# Patient Record
Sex: FEMALE | Race: WHITE | NOT HISPANIC OR LATINO | Employment: FULL TIME | URBAN - METROPOLITAN AREA
[De-identification: names, ages, dates, MRNs, and addresses within clinical notes are randomized per-mention and may not be internally consistent; named-entity substitution may affect disease eponyms.]

---

## 2023-09-14 PROBLEM — L72.3 SEBACEOUS CYST: Status: ACTIVE | Noted: 2023-09-14

## 2023-09-14 PROBLEM — E55.9 VITAMIN D DEFICIENCY: Status: ACTIVE | Noted: 2023-09-14

## 2023-09-14 PROBLEM — I83.891 VARICOSE VEINS OF RIGHT LOWER EXTREMITY WITH EDEMA: Status: ACTIVE | Noted: 2023-09-14

## 2023-09-14 PROBLEM — R92.30 DENSE BREAST TISSUE: Status: ACTIVE | Noted: 2023-09-14

## 2023-09-14 PROBLEM — I87.321 CHRONIC VENOUS HYPERTENSION WITH INFLAMMATION INVOLVING RIGHT SIDE: Status: ACTIVE | Noted: 2023-09-14

## 2023-09-14 PROBLEM — D64.9 ANEMIA: Status: ACTIVE | Noted: 2023-09-14

## 2023-09-14 PROBLEM — I80.9 PHLEBITIS: Status: ACTIVE | Noted: 2023-09-14

## 2023-09-14 RX ORDER — ALBUTEROL SULFATE 90 UG/1
AEROSOL, METERED RESPIRATORY (INHALATION)
COMMUNITY
Start: 2021-12-02

## 2023-09-14 RX ORDER — LEVOCETIRIZINE DIHYDROCHLORIDE 5 MG/1
1 TABLET, FILM COATED ORAL EVERY EVENING
COMMUNITY
Start: 2022-08-03

## 2023-09-14 RX ORDER — CHLORHEXIDINE GLUCONATE ORAL RINSE 1.2 MG/ML
SOLUTION DENTAL
COMMUNITY
Start: 2022-09-07

## 2023-09-14 RX ORDER — CLOTRIMAZOLE AND BETAMETHASONE DIPROPIONATE 10; .64 MG/G; MG/G
CREAM TOPICAL
COMMUNITY
Start: 2022-08-03

## 2023-09-14 RX ORDER — FAMOTIDINE 40 MG/1
1 TABLET, FILM COATED ORAL NIGHTLY
COMMUNITY
Start: 2022-08-03

## 2023-09-14 RX ORDER — CHLORHEXIDINE GLUCONATE 40 MG/ML
SOLUTION TOPICAL
COMMUNITY
Start: 2022-09-07

## 2023-09-14 RX ORDER — CHOLECALCIFEROL (VITAMIN D3) 125 MCG
1 TABLET ORAL DAILY
COMMUNITY

## 2023-09-14 RX ORDER — CYCLOBENZAPRINE HCL 10 MG
TABLET ORAL
COMMUNITY
Start: 2022-05-27

## 2023-10-17 ENCOUNTER — OFFICE VISIT (OUTPATIENT)
Dept: VASCULAR SURGERY | Facility: CLINIC | Age: 43
End: 2023-10-17
Payer: COMMERCIAL

## 2023-10-17 VITALS
HEART RATE: 83 BPM | DIASTOLIC BLOOD PRESSURE: 83 MMHG | BODY MASS INDEX: 30.01 KG/M2 | SYSTOLIC BLOOD PRESSURE: 133 MMHG | HEIGHT: 69 IN | WEIGHT: 202.6 LBS

## 2023-10-17 DIAGNOSIS — I87.2 VENOUS INSUFFICIENCY OF RIGHT LEG: Primary | ICD-10-CM

## 2023-10-17 PROCEDURE — 99213 OFFICE O/P EST LOW 20 MIN: CPT | Performed by: CLINICAL NURSE SPECIALIST

## 2023-10-17 NOTE — PATIENT INSTRUCTIONS
It Was a pleasure to see you today.  Status post right leg GSV ablation with stab phlebectomy for symptomatic varicose veins.  Doing very well and now asymptomatic.  Continue her medical compression stockings as needed.  Follow-up with us as needed as well.  Please call the office with any questions at [phone]. You can speak to our secretaries or our clinical nurses for specific questions.   If you need coordinating your appointments and testing you can do these at the  or by calling my office shortly after your visit.

## 2023-10-17 NOTE — PROGRESS NOTES
Vascular Surgery Consultation, History, Physical    Nay Álvarez is a 43 y.o. year old female patient with prior right leg GSV RFA and phlebectomy and also right lower calf.  Patient doing very well.  Patient states she is having no right leg pain, no right leg swelling or achiness.  Patient is compliant with wearing her medical compression stockings.  Patient denies any chest pain, shortness of breath, fever or chills.  History:     42 y/o female with C3 disease RLE, axial reflux and bulky GSV diffuse varicose veins with pain and swelling - compliant with compression and sx persist despite this.   affecting work - works standing.      1) you will benefit from a right GSV RFA ablation staged with phlebectomy.  3) We will proceed with insurance approval and call you once we can proceed with scheduling your procedures.     1) s/p right GSV RFA ablation   3) staged with phlebectomy to be scheduled     + right lower lateral calf phlebitis progressing  will start Eiiquis 5 mg PO 2 x/day for 1 month  start taking today  our office will call you to schedule your phlebectomy in the OR  Ok to start using [employer]nic[employer] cream/gel to bruised areas of your leg daily      10/24/2022   s/p right GSV RFA ablation 5/3/2022  s/p with phlebectomy right leg 9/9/2022  right leg is feeling great.   phlebectomy sites healing nicely  one small suture removed  Past Medical History:   Diagnosis Date    Cough, unspecified 12/02/2021    Cough    COVID-19 12/02/2021    COVID-19 virus infection    Encounter for other screening for malignant neoplasm of breast 01/31/2022    Breast screening    Headache, unspecified 12/02/2021    Headache    Inconclusive mammogram 01/03/2022    Dense breast tissue    Rash and other nonspecific skin eruption 08/03/2022    Rash    Sebaceous cyst 10/02/2017    Sebaceous cyst    Shortness of breath 12/02/2021    Shortness of breath    Vitamin D deficiency, unspecified 04/26/2022    Vitamin D deficiency       Past Surgical  History:   Procedure Laterality Date     SECTION, CLASSIC  2017     Section    NOSE SURGERY  2017    Nose Surgery       Active Ambulatory Problems     Diagnosis Date Noted    Anemia 2023    Chronic venous hypertension with inflammation involving right side 2023    Dense breast tissue 2023    Varicose veins of right lower extremity with edema 2023    Phlebitis 2023    Sebaceous cyst 2023    Vitamin D deficiency 2023     Resolved Ambulatory Problems     Diagnosis Date Noted    No Resolved Ambulatory Problems     Past Medical History:   Diagnosis Date    Cough, unspecified 2021    COVID-19 2021    Encounter for other screening for malignant neoplasm of breast 2022    Headache, unspecified 2021    Inconclusive mammogram 2022    Rash and other nonspecific skin eruption 2022    Shortness of breath 2021    Vitamin D deficiency, unspecified 2022       Review of Systems   All other systems reviewed and are negative.      No Known Allergies    Vitals:   Visit Vitals  /83 (BP Location: Right arm, Patient Position: Sitting, BP Cuff Size: Adult long)   Pulse 83     Physical Exam:   Vascular Physical Exam  Vitals and nursing note reviewed.   Skin:     General: Skin is warm and dry.      Comments: Mild bruising along phlebectomy sites      VCSS score right leg 5  VCSS score left leg 0    Labs:  LABS:  CBC with Differential:    Lab Results   Component Value Date    WBC 5.7 2022    RBC 4.02 2022    HGB 10.0 (L) 2022    HCT 32.8 (L) 2022     2022    MCV 82 2022    MCHC 30.5 (L) 2022    RDW 14.9 (H) 2022    NRBC 0.0 2022    LYMPHOPCT 22.6 2022    MONOPCT 9.5 2022    EOSPCT 3.3 2022    BASOPCT 0.7 2022    MONOSABS 0.54 2022    LYMPHSABS 1.29 2022    EOSABS 0.19 2022    BASOSABS 0.04 2022     CMP:    Lab  "Results   Component Value Date     (L) 09/07/2022    K 4.6 09/07/2022     09/07/2022    CO2 22 09/07/2022    BUN 9 09/07/2022    CREATININE 0.73 09/07/2022    GLUCOSE 94 09/07/2022    PROT 7.1 05/27/2022    CALCIUM 9.1 09/07/2022    BILITOT 0.3 05/27/2022    ALKPHOS 90 05/27/2022    AST 28 05/27/2022    ALT 23 05/27/2022     BMP:    Lab Results   Component Value Date     (L) 09/07/2022    K 4.6 09/07/2022     09/07/2022    CO2 22 09/07/2022    BUN 9 09/07/2022    CREATININE 0.73 09/07/2022    CALCIUM 9.1 09/07/2022    GLUCOSE 94 09/07/2022     Magnesium:No results found for: \"MG\"  Troponin:  No results found for: \"TROPHS\"  BNP: No results found for: \"BNP\"    Lab Results   Component Value Date    CHOL 190 01/13/2022    LDLF 92 01/13/2022    HDL 73.1 01/13/2022       Imaging:   Impression:     Plan:   It Was a pleasure to see you today.  Status post right leg GSV ablation with stab phlebectomy for symptomatic varicose veins.  Doing very well and now asymptomatic.  Continue her medical compression stockings as needed.  Follow-up with us as needed as well.  Please call the office with any questions at [phone]. You can speak to our secretaries or our clinical nurses for specific questions.   If you need coordinating your appointments and testing you can do these at the  or by calling my office shortly after your visit.    "

## 2024-11-08 ENCOUNTER — TELEPHONE (OUTPATIENT)
Dept: OBSTETRICS AND GYNECOLOGY | Facility: CLINIC | Age: 44
End: 2024-11-08
Payer: COMMERCIAL

## 2024-11-13 ENCOUNTER — HOSPITAL ENCOUNTER (OUTPATIENT)
Dept: RADIOLOGY | Facility: CLINIC | Age: 44
Discharge: HOME | End: 2024-11-13
Payer: COMMERCIAL

## 2024-11-13 VITALS — HEIGHT: 69 IN | WEIGHT: 190 LBS | BODY MASS INDEX: 28.14 KG/M2

## 2024-11-13 DIAGNOSIS — Z12.31 SCREENING MAMMOGRAM FOR BREAST CANCER: ICD-10-CM

## 2024-11-13 PROCEDURE — 77063 BREAST TOMOSYNTHESIS BI: CPT | Performed by: RADIOLOGY

## 2024-11-13 PROCEDURE — 77067 SCR MAMMO BI INCL CAD: CPT

## 2024-11-13 PROCEDURE — 77067 SCR MAMMO BI INCL CAD: CPT | Performed by: RADIOLOGY

## 2024-11-26 ENCOUNTER — APPOINTMENT (OUTPATIENT)
Dept: PRIMARY CARE | Facility: CLINIC | Age: 44
End: 2024-11-26
Payer: COMMERCIAL

## 2024-11-26 VITALS
DIASTOLIC BLOOD PRESSURE: 78 MMHG | BODY MASS INDEX: 30.07 KG/M2 | HEIGHT: 69 IN | WEIGHT: 203 LBS | SYSTOLIC BLOOD PRESSURE: 130 MMHG

## 2024-11-26 DIAGNOSIS — F43.29 STRESS AND ADJUSTMENT REACTION: ICD-10-CM

## 2024-11-26 DIAGNOSIS — Z12.11 SCREENING FOR MALIGNANT NEOPLASM OF COLON: ICD-10-CM

## 2024-11-26 DIAGNOSIS — Z00.00 HEALTHCARE MAINTENANCE: Primary | ICD-10-CM

## 2024-11-26 DIAGNOSIS — E55.9 VITAMIN D DEFICIENCY: ICD-10-CM

## 2024-11-26 DIAGNOSIS — Z23 FLU VACCINE NEED: ICD-10-CM

## 2024-11-26 PROCEDURE — RXMED WILLOW AMBULATORY MEDICATION CHARGE

## 2024-11-26 PROCEDURE — 99396 PREV VISIT EST AGE 40-64: CPT | Performed by: INTERNAL MEDICINE

## 2024-11-26 PROCEDURE — 90656 IIV3 VACC NO PRSV 0.5 ML IM: CPT | Performed by: INTERNAL MEDICINE

## 2024-11-26 PROCEDURE — 90471 IMMUNIZATION ADMIN: CPT | Performed by: INTERNAL MEDICINE

## 2024-11-26 PROCEDURE — 3008F BODY MASS INDEX DOCD: CPT | Performed by: INTERNAL MEDICINE

## 2024-11-26 RX ORDER — VENLAFAXINE 37.5 MG/1
37.5 TABLET ORAL 2 TIMES DAILY
Qty: 60 TABLET | Refills: 2 | Status: SHIPPED | OUTPATIENT
Start: 2024-11-26 | End: 2025-02-24

## 2024-11-26 ASSESSMENT — PATIENT HEALTH QUESTIONNAIRE - PHQ9
SUM OF ALL RESPONSES TO PHQ9 QUESTIONS 1 AND 2: 0
SUM OF ALL RESPONSES TO PHQ9 QUESTIONS 1 AND 2: 4
1. LITTLE INTEREST OR PLEASURE IN DOING THINGS: MORE THAN HALF THE DAYS
2. FEELING DOWN, DEPRESSED OR HOPELESS: NOT AT ALL
2. FEELING DOWN, DEPRESSED OR HOPELESS: MORE THAN HALF THE DAYS
1. LITTLE INTEREST OR PLEASURE IN DOING THINGS: NOT AT ALL

## 2024-11-26 NOTE — PATIENT INSTRUCTIONS
COLONOSCOPY SCHEDULING   [facility]                                            [phone]  #2 Dr David Wick, Dr Tyshawn Wilks, Dr Glenny Cadena

## 2024-11-26 NOTE — PROGRESS NOTES
Subjective   Patient ID: Nay Álvarez is a 44 y.o. female who presents for No chief complaint on file..    HPI  Patient in for a visit  Mother would have been 75 lost her in March , depressed  might need something will set up counseling, does the night shift has periods of insomnia one more year of son being in high school , for now do night shifts  Would sleep 6 hours during the day sometimes broken     Patient comes in for a physical exam last one done over a year ago , doing well over-all with no particular complaints. Also is in for laboratory review and health maintenance update.  Updating family history as well.  Interval event - past medical history, surgical, social, and family history reviewed and updated.  Interval care -  Patient is    up to date with dental care.  Patient does     receive routine vision care.    Review of Systems  General: Denies fever, chills, night sweats, changes in appetite or weight  ENT: Negative for ear pain, hearing loss, headache, difficulty swallowing, up to date with dental checks   Eyes: Negative for recent visual changes, up to date with eye exams  Dermatologic: Negative for new skin conditions, rash  Respiratory: Negative for paroxysmal nocturnal dyspnea, wheezing,shortness of breath, cough  Cardiovascular: Negative for chest pain, palpitations, or leg swelling  Gastrointestinal: Negative for nausea/vomiting, abdominal pain, changes in bowel habits  Genitourinary: Negative for dysuria, urgency, frequency  URINARY INCONTINENCE   Neurological: Negative for headaches, tremors, dizziness, memory loss, confusion, weakness, paresthesias  Psychiatric: Negative for sleep problems, anxiety, depression, conditions are stable  Endocrine: Negative for heat or cold intolerance, polyuria, polydipsia  Other:All systems have been reviewed and are negative except as previously noted.    Previous history  Past Medical History:   Diagnosis Date   • Cough, unspecified 12/02/2021    Cough   •  COVID-19 2021    COVID-19 virus infection   • Encounter for other screening for malignant neoplasm of breast 2022    Breast screening   • Headache, unspecified 2021    Headache   • Inconclusive mammogram 2022    Dense breast tissue   • Rash and other nonspecific skin eruption 2022    Rash   • Sebaceous cyst 10/02/2017    Sebaceous cyst   • Shortness of breath 2021    Shortness of breath   • Vitamin D deficiency, unspecified 2022    Vitamin D deficiency     Past Surgical History:   Procedure Laterality Date   •  SECTION, CLASSIC  2017     Section   • NOSE SURGERY  2017    Nose Surgery     Social History     Tobacco Use   • Smoking status: Never   • Smokeless tobacco: Never   Vaping Use   • Vaping status: Never Used     Family History   Problem Relation Name Age of Onset   • Other (fibrocystic disease of breast) Mother     • Other (cardiac disorder) Father     • Prostate cancer Father     • Thyroid disease Sister     • Other (cardiac disorder) Maternal Grandmother     • Depression Maternal Grandfather     • Anxiety disorder Maternal Grandfather       No Known Allergies  Current Outpatient Medications   Medication Instructions   • albuterol 90 mcg/actuation inhaler INHALE 1 TO 2 PUFFS BY MOUTH EVERY 4 TO 6 HOURS AS NEEDED   • apixaban (Eliquis) 5 mg tablet 1 tablet, 2 times daily   • chlorhexidine (Hibiclens) 4 % external liquid USE AS DIRECTED as preop shower   • chlorhexidine (Peridex) 0.12 % solution RINSE MOUTH WITH 15ML FOR 30 SECONDS AM AND PM AFTER TOOTHBRUSHING. EXPECTORATE AFTER RINSING, DO NOT SWALLOW   • clotrimazole-betamethasone (Lotrisone) cream APPLY  AND RUB  IN A THIN FILM TO AFFECTED AREAS TWICE DAILY.(AM AND PM).   • cyclobenzaprine (Flexeril) 10 mg tablet 1/2 - 1 tablet 3x a day as needed for pain or spasm, sedating   • ergocalciferol, vitamin D2, 50 mcg (2,000 unit) tablet 1 tablet, Daily   • famotidine (Pepcid) 40 mg tablet 1  tablet, Nightly   • levocetirizine (Xyzal) 5 mg tablet 1 tablet, Every evening       Objective       Physical Exam  Vital Signs: as recorded above  General: Well groomed, well nourished   Orientation:  Alert , oriented to time, place , and person   Mood and Affect:  Cooperative , no apparent distress normal affect  Skin: Good color, good turgor  Eyes: Extra ocular muscle movements intact, anicteric sclerae  Neck: Supple, full range of movement  Chest: Normal breath sounds, normal chest wall exam, symmetric, good air entry, clear to auscultation  Heart: Regular rate and rhythm, without murmur, gallop, or rubs  Abdomen soft nontender no masses felt no hepatosplenomegaly, no rebound or guarding  BACK:  no CTLS spine tenderness, no flank tenderness  Extremities: full range of movement  bilateral UE and bilateral LE,  no lower extremity edema  Neurological: Alert, oriented, cranial nerves II-XII intact except for visual acuity  Sensation:  Intact   Gait: normal steady      Assessment/Plan   Nay Álvarez is a 44 y.o. 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 Vitamin D 800-1000 iu a day, check levels since  last lab work done showed slightly low levels.  . For Female Patients Only:PAP test yearly as per gynecology   Preventive Medicine: colon cancer screening by age 45 if no  family history, balanced diet, and exercise as discussed. Seat belt use for injury prevention  Substance use and /or tobacco use not applicable / counseled when applicable. Immunizations TD  up to date.  Yearly flu vaccine unless contraindicated . More than 50% of office visit time spent counseling the patient, questions were answered. Complete physical examination in a year. Patient knows either has access to  [employer] to see results and messages regarding these or will call us if cannot see them         Discussed with:   Return in :    Portions of this note were generated using digital voice recognition software, and may contain grammatical errors       Quan Dietrich MD  11/26/24  10:46 AM

## 2024-11-27 ENCOUNTER — LAB (OUTPATIENT)
Dept: LAB | Facility: LAB | Age: 44
End: 2024-11-27
Payer: COMMERCIAL

## 2024-11-27 DIAGNOSIS — E55.9 VITAMIN D DEFICIENCY: ICD-10-CM

## 2024-11-27 DIAGNOSIS — Z00.00 HEALTHCARE MAINTENANCE: ICD-10-CM

## 2024-11-27 LAB
25(OH)D3 SERPL-MCNC: 32 NG/ML (ref 30–100)
ALBUMIN SERPL BCP-MCNC: 4.2 G/DL (ref 3.4–5)
ALP SERPL-CCNC: 83 U/L (ref 33–110)
ALT SERPL W P-5'-P-CCNC: 18 U/L (ref 7–45)
ANION GAP SERPL CALC-SCNC: 15 MMOL/L (ref 10–20)
APPEARANCE UR: CLEAR
AST SERPL W P-5'-P-CCNC: 30 U/L (ref 9–39)
BASOPHILS # BLD AUTO: 0.09 X10*3/UL (ref 0–0.1)
BASOPHILS NFR BLD AUTO: 1.8 %
BILIRUB SERPL-MCNC: 0.6 MG/DL (ref 0–1.2)
BILIRUB UR STRIP.AUTO-MCNC: NEGATIVE MG/DL
BUN SERPL-MCNC: 7 MG/DL (ref 6–23)
CALCIUM SERPL-MCNC: 8.7 MG/DL (ref 8.6–10.3)
CHLORIDE SERPL-SCNC: 106 MMOL/L (ref 98–107)
CHOLEST SERPL-MCNC: 171 MG/DL (ref 0–199)
CHOLESTEROL/HDL RATIO: 2.7
CO2 SERPL-SCNC: 19 MMOL/L (ref 21–32)
COLOR UR: NORMAL
CREAT SERPL-MCNC: 0.7 MG/DL (ref 0.5–1.05)
EGFRCR SERPLBLD CKD-EPI 2021: >90 ML/MIN/1.73M*2
EOSINOPHIL # BLD AUTO: 0.25 X10*3/UL (ref 0–0.7)
EOSINOPHIL NFR BLD AUTO: 5 %
ERYTHROCYTE [DISTWIDTH] IN BLOOD BY AUTOMATED COUNT: 17.4 % (ref 11.5–14.5)
GLUCOSE SERPL-MCNC: 91 MG/DL (ref 74–99)
GLUCOSE UR STRIP.AUTO-MCNC: NORMAL MG/DL
HCT VFR BLD AUTO: 34.6 % (ref 36–46)
HDLC SERPL-MCNC: 63.2 MG/DL
HGB BLD-MCNC: 10.3 G/DL (ref 12–16)
IMM GRANULOCYTES # BLD AUTO: 0.01 X10*3/UL (ref 0–0.7)
IMM GRANULOCYTES NFR BLD AUTO: 0.2 % (ref 0–0.9)
KETONES UR STRIP.AUTO-MCNC: NEGATIVE MG/DL
LDLC SERPL CALC-MCNC: 80 MG/DL
LEUKOCYTE ESTERASE UR QL STRIP.AUTO: NEGATIVE
LYMPHOCYTES # BLD AUTO: 1.48 X10*3/UL (ref 1.2–4.8)
LYMPHOCYTES NFR BLD AUTO: 29.6 %
MCH RBC QN AUTO: 22.2 PG (ref 26–34)
MCHC RBC AUTO-ENTMCNC: 29.8 G/DL (ref 32–36)
MCV RBC AUTO: 74 FL (ref 80–100)
MONOCYTES # BLD AUTO: 0.49 X10*3/UL (ref 0.1–1)
MONOCYTES NFR BLD AUTO: 9.8 %
NEUTROPHILS # BLD AUTO: 2.68 X10*3/UL (ref 1.2–7.7)
NEUTROPHILS NFR BLD AUTO: 53.6 %
NITRITE UR QL STRIP.AUTO: NEGATIVE
NON HDL CHOLESTEROL: 108 MG/DL (ref 0–149)
NRBC BLD-RTO: 0 /100 WBCS (ref 0–0)
PH UR STRIP.AUTO: 7 [PH]
PLATELET # BLD AUTO: 330 X10*3/UL (ref 150–450)
POTASSIUM SERPL-SCNC: 4.3 MMOL/L (ref 3.5–5.3)
PROT SERPL-MCNC: 7.4 G/DL (ref 6.4–8.2)
PROT UR STRIP.AUTO-MCNC: NEGATIVE MG/DL
RBC # BLD AUTO: 4.65 X10*6/UL (ref 4–5.2)
RBC # UR STRIP.AUTO: NEGATIVE /UL
SODIUM SERPL-SCNC: 136 MMOL/L (ref 136–145)
SP GR UR STRIP.AUTO: 1.02
TRIGL SERPL-MCNC: 139 MG/DL (ref 0–149)
TSH SERPL-ACNC: 2.69 MIU/L (ref 0.44–3.98)
UROBILINOGEN UR STRIP.AUTO-MCNC: NORMAL MG/DL
VLDL: 28 MG/DL (ref 0–40)
WBC # BLD AUTO: 5 X10*3/UL (ref 4.4–11.3)

## 2024-11-27 PROCEDURE — 81003 URINALYSIS AUTO W/O SCOPE: CPT

## 2024-11-27 PROCEDURE — 36415 COLL VENOUS BLD VENIPUNCTURE: CPT

## 2024-11-27 PROCEDURE — 80061 LIPID PANEL: CPT

## 2024-11-27 PROCEDURE — 85025 COMPLETE CBC W/AUTO DIFF WBC: CPT

## 2024-11-27 PROCEDURE — 84443 ASSAY THYROID STIM HORMONE: CPT

## 2024-11-27 PROCEDURE — 80053 COMPREHEN METABOLIC PANEL: CPT

## 2024-11-27 PROCEDURE — 82306 VITAMIN D 25 HYDROXY: CPT

## 2024-12-02 ENCOUNTER — PHARMACY VISIT (OUTPATIENT)
Dept: PHARMACY | Facility: CLINIC | Age: 44
End: 2024-12-02
Payer: COMMERCIAL

## 2025-02-26 ENCOUNTER — APPOINTMENT (OUTPATIENT)
Dept: PRIMARY CARE | Facility: CLINIC | Age: 45
End: 2025-02-26
Payer: COMMERCIAL

## 2025-03-24 ENCOUNTER — APPOINTMENT (OUTPATIENT)
Dept: PRIMARY CARE | Facility: CLINIC | Age: 45
End: 2025-03-24
Payer: COMMERCIAL

## 2025-04-10 ENCOUNTER — APPOINTMENT (OUTPATIENT)
Dept: PRIMARY CARE | Facility: CLINIC | Age: 45
End: 2025-04-10
Payer: COMMERCIAL

## 2025-05-14 ENCOUNTER — APPOINTMENT (OUTPATIENT)
Dept: PRIMARY CARE | Facility: CLINIC | Age: 45
End: 2025-05-14
Payer: COMMERCIAL

## 2025-06-04 ENCOUNTER — APPOINTMENT (OUTPATIENT)
Dept: PRIMARY CARE | Facility: CLINIC | Age: 45
End: 2025-06-04
Payer: COMMERCIAL

## 2025-06-30 ENCOUNTER — APPOINTMENT (OUTPATIENT)
Dept: PRIMARY CARE | Facility: CLINIC | Age: 45
End: 2025-06-30
Payer: COMMERCIAL

## 2025-07-25 ENCOUNTER — APPOINTMENT (OUTPATIENT)
Dept: PRIMARY CARE | Facility: CLINIC | Age: 45
End: 2025-07-25
Payer: COMMERCIAL